# Patient Record
Sex: MALE | Race: WHITE | URBAN - METROPOLITAN AREA
[De-identification: names, ages, dates, MRNs, and addresses within clinical notes are randomized per-mention and may not be internally consistent; named-entity substitution may affect disease eponyms.]

---

## 2021-04-27 ENCOUNTER — OFFICE VISIT (OUTPATIENT)
Dept: URBAN - METROPOLITAN AREA CLINIC 26 | Facility: CLINIC | Age: 76
End: 2021-04-27
Payer: MEDICARE

## 2021-04-27 DIAGNOSIS — H52.4 PRESBYOPIA: ICD-10-CM

## 2021-04-27 DIAGNOSIS — Z79.84 LONG TERM (CURRENT) USE OF ORAL HYPOGLYCEMIC DRUGS: ICD-10-CM

## 2021-04-27 DIAGNOSIS — E11.9 TYPE 2 DIABETES MELLITUS W/O COMPLICATION: Primary | ICD-10-CM

## 2021-04-27 DIAGNOSIS — H43.813 VITREOUS DEGENERATION, BILATERAL: ICD-10-CM

## 2021-04-27 DIAGNOSIS — H53.2 DIPLOPIA: ICD-10-CM

## 2021-04-27 PROCEDURE — 92134 CPTRZ OPH DX IMG PST SGM RTA: CPT | Performed by: OPTOMETRIST

## 2021-04-27 PROCEDURE — 92004 COMPRE OPH EXAM NEW PT 1/>: CPT | Performed by: OPTOMETRIST

## 2021-04-27 ASSESSMENT — INTRAOCULAR PRESSURE
OD: 15
OS: 15

## 2021-04-27 ASSESSMENT — KERATOMETRY
OS: 42.00
OD: 42.63

## 2021-04-27 ASSESSMENT — VISUAL ACUITY
OD: 20/20
OS: 20/20

## 2021-04-27 NOTE — IMPRESSION/PLAN
Impression: Diplopia: H53.2. Plan: 6 eso and 7 BU OD at distance and 12 exo and 7 BU OD at near. failed with current progressives. doing well with current readers. srx for distance only given with prism.

## 2021-04-27 NOTE — IMPRESSION/PLAN
Impression: Type 2 diabetes mellitus w/o complication: Q39.1. Plan: No Non-Proliferative Diabetic Retinopathy, no Diabetic Macular Edema and no Neovascularization of the iris, disc, or elsewhere. Discussed ocular and systemic benefits of blood sugar control. Check annually.

## 2021-04-27 NOTE — IMPRESSION/PLAN
Impression: Vitreous degeneration, bilateral: H43.813. Plan: No retinal pathology. No family history, prior peripheral retinal disease, or other risk factors evident. Warning signs of retinal tear and detachment discussed with patient. To return to clinic STAT if any change in symptoms consistent with RT or RD.

## 2021-04-27 NOTE — IMPRESSION/PLAN
Impression: Long term (current) use of oral hypoglycemic drugs: Z79.84. Plan: See other diabetic plan.

## 2021-08-28 ENCOUNTER — TELEPHONE ENCOUNTER (OUTPATIENT)
Dept: URBAN - METROPOLITAN AREA CLINIC 13 | Facility: CLINIC | Age: 76
End: 2021-08-28

## 2021-08-29 ENCOUNTER — TELEPHONE ENCOUNTER (OUTPATIENT)
Dept: URBAN - METROPOLITAN AREA CLINIC 13 | Facility: CLINIC | Age: 76
End: 2021-08-29

## 2022-04-27 ENCOUNTER — OFFICE VISIT (OUTPATIENT)
Dept: URBAN - METROPOLITAN AREA CLINIC 26 | Facility: CLINIC | Age: 77
End: 2022-04-27
Payer: MEDICARE

## 2022-04-27 DIAGNOSIS — E11.9 TYPE 2 DIABETES MELLITUS W/O COMPLICATION: Primary | ICD-10-CM

## 2022-04-27 DIAGNOSIS — Z96.1 PRESENCE OF INTRAOCULAR LENS: ICD-10-CM

## 2022-04-27 DIAGNOSIS — H53.2 DIPLOPIA: ICD-10-CM

## 2022-04-27 DIAGNOSIS — H52.4 PRESBYOPIA: ICD-10-CM

## 2022-04-27 DIAGNOSIS — H04.123 TEAR FILM INSUFFICIENCY OF BILATERAL LACRIMAL GLANDS: ICD-10-CM

## 2022-04-27 DIAGNOSIS — H43.813 VITREOUS DEGENERATION, BILATERAL: ICD-10-CM

## 2022-04-27 DIAGNOSIS — Z79.84 LONG TERM (CURRENT) USE OF ORAL HYPOGLYCEMIC DRUGS: ICD-10-CM

## 2022-04-27 PROCEDURE — 92134 CPTRZ OPH DX IMG PST SGM RTA: CPT | Performed by: OPTOMETRIST

## 2022-04-27 PROCEDURE — 92014 COMPRE OPH EXAM EST PT 1/>: CPT | Performed by: OPTOMETRIST

## 2022-04-27 ASSESSMENT — VISUAL ACUITY
OS: 20/25
OD: 20/25

## 2022-04-27 ASSESSMENT — KERATOMETRY
OD: 42.50
OS: 41.88

## 2022-04-27 ASSESSMENT — INTRAOCULAR PRESSURE
OS: 16
OD: 15

## 2022-04-27 NOTE — IMPRESSION/PLAN
Impression: Type 2 diabetes mellitus w/o complication: W92.3. Plan: No Non-Proliferative Diabetic Retinopathy, no Diabetic Macular Edema and no Neovascularization of the iris, disc, or elsewhere. Discussed ocular and systemic benefits of blood sugar control. Check annually.

## 2022-04-27 NOTE — IMPRESSION/PLAN
Impression: Diplopia: H53.2. Plan: pt reports clear and single vision with NVO and DVO glasses with prism. srx, NVO or DVO, available if desired.

## 2023-05-04 ENCOUNTER — OFFICE VISIT (OUTPATIENT)
Dept: URBAN - METROPOLITAN AREA CLINIC 26 | Facility: CLINIC | Age: 78
End: 2023-05-04
Payer: MEDICARE

## 2023-05-04 DIAGNOSIS — Z96.1 PRESENCE OF INTRAOCULAR LENS: ICD-10-CM

## 2023-05-04 DIAGNOSIS — H53.2 DIPLOPIA: ICD-10-CM

## 2023-05-04 DIAGNOSIS — H52.4 PRESBYOPIA: ICD-10-CM

## 2023-05-04 DIAGNOSIS — H43.813 VITREOUS DEGENERATION, BILATERAL: ICD-10-CM

## 2023-05-04 DIAGNOSIS — H16.223 KERATOCONJUNCTIVITIS SICCA, BILATERAL: ICD-10-CM

## 2023-05-04 DIAGNOSIS — Z79.84 LONG TERM (CURRENT) USE OF ORAL HYPOGLYCEMIC DRUGS: ICD-10-CM

## 2023-05-04 DIAGNOSIS — E11.9 TYPE 2 DIABETES MELLITUS W/O COMPLICATION: Primary | ICD-10-CM

## 2023-05-04 PROCEDURE — 92014 COMPRE OPH EXAM EST PT 1/>: CPT | Performed by: OPTOMETRIST

## 2023-05-04 PROCEDURE — 92134 CPTRZ OPH DX IMG PST SGM RTA: CPT | Performed by: OPTOMETRIST

## 2023-05-04 ASSESSMENT — INTRAOCULAR PRESSURE
OD: 19
OS: 16

## 2023-05-04 ASSESSMENT — KERATOMETRY
OS: 41.38
OD: 41.88

## 2023-05-04 ASSESSMENT — VISUAL ACUITY
OD: 20/20
OS: 20/20

## 2023-05-04 NOTE — IMPRESSION/PLAN
Impression: Keratoconjunctivitis sicca, bilateral: S26.654. Plan: Recommend artificial tears at least 4 times a day and gel drop or tear ointment at bedtime, Omega 3 fatty acids (2-3,000 mg) daily. Drink plenty of water. No overhead fans at bedtime.

## 2023-05-04 NOTE — IMPRESSION/PLAN
Impression: Type 2 diabetes mellitus w/o complication: Z60.2. Plan: No Non-Proliferative Diabetic Retinopathy, no Diabetic Macular Edema and no Neovascularization of the iris, disc, or elsewhere. Discussed ocular and systemic benefits of blood sugar control. Check annually.

## 2023-10-18 ENCOUNTER — OFFICE VISIT (OUTPATIENT)
Dept: URBAN - METROPOLITAN AREA CLINIC 26 | Facility: CLINIC | Age: 78
End: 2023-10-18
Payer: MEDICARE

## 2023-10-18 DIAGNOSIS — H52.4 PRESBYOPIA: Primary | ICD-10-CM

## 2023-10-18 PROCEDURE — 92015 DETERMINE REFRACTIVE STATE: CPT | Performed by: OPTOMETRIST

## 2023-10-18 ASSESSMENT — VISUAL ACUITY
OD: 20/20
OS: 20/20

## 2024-04-11 ENCOUNTER — OFFICE VISIT (OUTPATIENT)
Dept: URBAN - METROPOLITAN AREA CLINIC 26 | Facility: CLINIC | Age: 79
End: 2024-04-11
Payer: MEDICARE

## 2024-04-11 DIAGNOSIS — H43.813 VITREOUS DEGENERATION, BILATERAL: ICD-10-CM

## 2024-04-11 DIAGNOSIS — H16.223 KERATOCONJUNCTIVITIS SICCA, BILATERAL: ICD-10-CM

## 2024-04-11 DIAGNOSIS — H53.2 DIPLOPIA: ICD-10-CM

## 2024-04-11 DIAGNOSIS — H52.4 PRESBYOPIA: ICD-10-CM

## 2024-04-11 DIAGNOSIS — E11.9 TYPE 2 DIABETES MELLITUS WITHOUT COMPLICATIONS: Primary | ICD-10-CM

## 2024-04-11 DIAGNOSIS — Z79.84 LONG TERM (CURRENT) USE OF ORAL HYPOGLYCEMIC DRUGS: ICD-10-CM

## 2024-04-11 DIAGNOSIS — Z96.1 PRESENCE OF INTRAOCULAR LENS: ICD-10-CM

## 2024-04-11 PROCEDURE — 92134 CPTRZ OPH DX IMG PST SGM RTA: CPT | Performed by: OPTOMETRIST

## 2024-04-11 PROCEDURE — 92014 COMPRE OPH EXAM EST PT 1/>: CPT | Performed by: OPTOMETRIST

## 2024-04-11 ASSESSMENT — INTRAOCULAR PRESSURE
OS: 15
OD: 17

## 2024-04-11 ASSESSMENT — VISUAL ACUITY
OD: 20/20
OS: 20/20

## 2024-04-11 ASSESSMENT — KERATOMETRY
OS: 41.50
OD: 41.88

## 2025-04-10 ENCOUNTER — OFFICE VISIT (OUTPATIENT)
Dept: URBAN - METROPOLITAN AREA CLINIC 26 | Facility: CLINIC | Age: 80
End: 2025-04-10
Payer: MEDICARE

## 2025-04-10 DIAGNOSIS — Z79.84 LONG TERM (CURRENT) USE OF ORAL HYPOGLYCEMIC DRUGS: ICD-10-CM

## 2025-04-10 DIAGNOSIS — H43.813 VITREOUS DEGENERATION, BILATERAL: ICD-10-CM

## 2025-04-10 DIAGNOSIS — E11.9 TYPE 2 DIABETES MELLITUS WITHOUT COMPLICATIONS: Primary | ICD-10-CM

## 2025-04-10 DIAGNOSIS — Z96.1 PRESENCE OF INTRAOCULAR LENS: ICD-10-CM

## 2025-04-10 DIAGNOSIS — H53.2 DIPLOPIA: ICD-10-CM

## 2025-04-10 DIAGNOSIS — H52.4 PRESBYOPIA: ICD-10-CM

## 2025-04-10 DIAGNOSIS — H16.223 KERATOCONJUNCTIVITIS SICCA, BILATERAL: ICD-10-CM

## 2025-04-10 PROCEDURE — 92134 CPTRZ OPH DX IMG PST SGM RTA: CPT | Performed by: OPTOMETRIST

## 2025-04-10 PROCEDURE — 92014 COMPRE OPH EXAM EST PT 1/>: CPT | Performed by: OPTOMETRIST

## 2025-04-10 ASSESSMENT — KERATOMETRY
OS: 42.88
OD: 42.13

## 2025-04-10 ASSESSMENT — INTRAOCULAR PRESSURE
OD: 10
OS: 16

## 2025-04-10 ASSESSMENT — VISUAL ACUITY
OD: 20/20
OS: 20/25